# Patient Record
Sex: FEMALE | Race: WHITE | Employment: OTHER | URBAN - NONMETROPOLITAN AREA
[De-identification: names, ages, dates, MRNs, and addresses within clinical notes are randomized per-mention and may not be internally consistent; named-entity substitution may affect disease eponyms.]

---

## 2020-11-19 ENCOUNTER — OFFICE VISIT (OUTPATIENT)
Dept: PULMONOLOGY | Age: 80
End: 2020-11-19
Payer: MEDICARE

## 2020-11-19 VITALS
BODY MASS INDEX: 37.89 KG/M2 | SYSTOLIC BLOOD PRESSURE: 118 MMHG | HEART RATE: 52 BPM | HEIGHT: 65 IN | OXYGEN SATURATION: 96 % | TEMPERATURE: 96.9 F | DIASTOLIC BLOOD PRESSURE: 72 MMHG | WEIGHT: 227.4 LBS

## 2020-11-19 PROCEDURE — G8400 PT W/DXA NO RESULTS DOC: HCPCS | Performed by: NURSE PRACTITIONER

## 2020-11-19 PROCEDURE — 1036F TOBACCO NON-USER: CPT | Performed by: NURSE PRACTITIONER

## 2020-11-19 PROCEDURE — G8427 DOCREV CUR MEDS BY ELIG CLIN: HCPCS | Performed by: NURSE PRACTITIONER

## 2020-11-19 PROCEDURE — 4040F PNEUMOC VAC/ADMIN/RCVD: CPT | Performed by: NURSE PRACTITIONER

## 2020-11-19 PROCEDURE — G8417 CALC BMI ABV UP PARAM F/U: HCPCS | Performed by: NURSE PRACTITIONER

## 2020-11-19 PROCEDURE — G8484 FLU IMMUNIZE NO ADMIN: HCPCS | Performed by: NURSE PRACTITIONER

## 2020-11-19 PROCEDURE — 1123F ACP DISCUSS/DSCN MKR DOCD: CPT | Performed by: NURSE PRACTITIONER

## 2020-11-19 PROCEDURE — 1090F PRES/ABSN URINE INCON ASSESS: CPT | Performed by: NURSE PRACTITIONER

## 2020-11-19 PROCEDURE — 94618 PULMONARY STRESS TESTING: CPT | Performed by: NURSE PRACTITIONER

## 2020-11-19 PROCEDURE — 99204 OFFICE O/P NEW MOD 45 MIN: CPT | Performed by: NURSE PRACTITIONER

## 2020-11-19 RX ORDER — FUROSEMIDE 40 MG/1
40 TABLET ORAL 2 TIMES DAILY
COMMUNITY
End: 2022-06-02

## 2020-11-19 ASSESSMENT — ENCOUNTER SYMPTOMS
SHORTNESS OF BREATH: 1
NAUSEA: 0
WHEEZING: 0
CHEST TIGHTNESS: 0
VOMITING: 0
STRIDOR: 0
COUGH: 0
DIARRHEA: 0

## 2020-11-19 NOTE — PROGRESS NOTES
[facility]    Patient: Caroline Ramesh, 79 y.o.   : 2020         Subjective     Chief Complaint   Patient presents with    New Patient     New pulmonary consult ref by [facility] for SOB no testing         Shortness of Breath   This is a new problem. The current episode started 1 to 4 weeks ago. The problem occurs daily. The problem has been rapidly improving. Associated symptoms include leg swelling. Pertinent negatives include no chest pain, fever, headaches, leg pain, syncope, vomiting or wheezing. Risk factors include smoking. She has tried rest for the symptoms. The treatment provided significant relief. Her past medical history is significant for CAD and a heart failure. Jagruti Frankel is here for evaluation of shortness of breath with referral from [facility]. [facility] for SOB. Patient PMH significant for CAD, HFpEF, HTN, hypothyroidism, obesity, TIA, and unspecified sleep apnea being followed by PCP Dr. Ramón Pelaez. Patient reports about 2-3 weeks ago she started having increased SOB with exertion. patient states that currently the symptom is better but still present. She has tried rest with quick recovery of her SOB. Only happens with exertion, but then gives conflicting story that she has no SOB with ambulating at home. Patient was diagnosed with COPD in the past but no other chronic respiratory condition ie asthma, or ILD. (Told had COPD not on any inhalers, never on supplemental O2)  Patient has not been admitted or treated for pneumonia, pulmonary embolism, or respiratory failure. Patient has not been intubated for reasons other than planned procedures. Patient not the best historian. Brother rob assisting with history. Social History  Patient job history included  until 44 yrs old then became [address] traveled [employer] over the [employer]" Currently retired. She has not had exposure to aerosolized particles or hazardous fumes.  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 Denies to active tobacco smoking former smoker 22 years 1 PPD  Denies exposure to pets/animals at home. Does not know if had exposure to tuberculosis with her travel, denies any weight loss hemoptysis, night, sweats. Past Medical hx   PMH:  Past Medical History:   Diagnosis Date    CAD (coronary artery disease)     Heart Cath 2018    Chest pain     Congestive heart failure (CHF) (HCC)     Diastolic follows with Dr. Nita Peguero    Dyslipidemia     Hypertension     Hypothyroidism     Obesity     TIA (transient ischemic attack) ?     Unspecified sleep apnea     uses cpap--sometimes     SURGICAL HISTORY:  Past Surgical History:   Procedure Laterality Date    BREAST LUMPECTOMY Left     GASTRIC BYPASS SURGERY      HYSTERECTOMY      JOINT REPLACEMENT      TONSILLECTOMY AND ADENOIDECTOMY       SOCIAL HISTORY:  Social History     Tobacco Use    Smoking status: Former Smoker     Types: Cigarettes     Start date:      Last attempt to quit:      Years since quittin.9    Smokeless tobacco: Never Used   Substance Use Topics    Alcohol use: Never     Frequency: Never    Drug use: Never     ALLERGIES:No Known Allergies  FAMILY HISTORY:  Family History   Problem Relation Age of Onset    Cancer Father     Dementia Mother      CURRENT MEDICATIONS:  Current Outpatient Medications   Medication Sig Dispense Refill    furosemide (LASIX) 40 MG tablet Take 40 mg by mouth 2 times daily      Cyanocobalamin 5000 MCG SUBL Place under the tongue daily Indications: cyanocobalamin-cobamamide 5,000-100 mcg       Cholecalciferol (VITAMIN D3) 125 MCG (5000 UT) TABS Take by mouth daily      levothyroxine (SYNTHROID) 100 MCG tablet Take 100 mcg by mouth Daily      atorvastatin (LIPITOR) 10 MG tablet Take 10 mg by mouth daily      clopidogrel (PLAVIX) 75 MG tablet Take 75 mg by mouth daily      pantoprazole (PROTONIX) 40 MG tablet Take 1 tablet by mouth 2 times daily (before meals) 60 tablet 6    isosorbide mononitrate (IMDUR) 30 MG CR tablet Take 30 mg by mouth daily       aspirin 81 MG tablet Take 81 mg by mouth daily.  metoprolol (TOPROL-XL) 25 MG XL tablet Take 25 mg by mouth daily.  MULTIPLE VITAMINS PO Take 1 tablet by mouth daily.  alendronate (FOSAMAX) 70 MG tablet Take 70 mg by mouth every 7 days      mupirocin (BACTROBAN) 2 % nasal ointment by Nasal route 2 times daily Take by Nasal route 2 times daily.  ondansetron (ZOFRAN) 4 MG tablet Take 1 tablet by mouth 3 times daily as needed for Nausea or Vomiting (Patient not taking: Reported on 11/19/2020) 30 tablet 3    hydrochlorothiazide (MICROZIDE) 12.5 MG capsule Take 12.5 mg by mouth daily.  traMADol (ULTRAM) 50 MG tablet Take 50 mg by mouth every 6 hours as needed for Pain. No current facility-administered medications for this visit. Veronica BRAVO   Review of Systems   Constitutional: Negative for chills, fever and unexpected weight change. Respiratory: Positive for shortness of breath. Negative for cough, chest tightness, wheezing and stridor. Cardiovascular: Positive for leg swelling. Negative for chest pain and syncope. Gastrointestinal: Negative for diarrhea, nausea and vomiting. Genitourinary: Negative for dysuria. Neurological: Negative for headaches. Physical exam   /72 (Site: Left Upper Arm, Position: Sitting, Cuff Size: Medium Adult)   Pulse 52   Temp 96.9 °F (36.1 °C)   Ht 5' 5\" (1.651 m)   Wt 227 lb 6.4 oz (103.1 kg)   SpO2 96% Comment: on RA  BMI 37.84 kg/m²    Wt Readings from Last 3 Encounters:   11/19/20 227 lb 6.4 oz (103.1 kg)   08/13/20 220 lb (99.8 kg)     Normally 218 at home per patient    Physical Exam  Vitals signs and nursing note reviewed.    Constitutional:       General: She is not in acute distress. Appearance: She is well-developed. Comments: Elderly female BMI 37   HENT:      Head: Normocephalic and atraumatic. Neck:      Musculoskeletal: Neck supple. Trachea: No tracheal deviation. Cardiovascular:      Rate and Rhythm: Normal rate and regular rhythm. Heart sounds: Normal heart sounds. No murmur. Pulmonary:      Effort: Pulmonary effort is normal. No respiratory distress. Breath sounds: Normal breath sounds. No stridor. No wheezing or rales. Chest:      Chest wall: No tenderness. Abdominal:      General: Bowel sounds are normal.      Palpations: Abdomen is soft. Musculoskeletal:      Right lower leg: Edema present. Left lower leg: Edema present. Comments: +1   Skin:     General: Skin is warm and dry. Capillary Refill: Capillary refill takes less than 2 seconds. Neurological:      Mental Status: She is alert and oriented to person, place, and time. Comments: Some difficulty with recall   Psychiatric:         Behavior: Behavior normal.         Thought Content: Thought content normal.          results   Lung Nodule Screening     [] Qualifies    [x] Does not qualify   [] Declined    [] Completed  Age 79 smoked <30 pack years   The [facility]ends annual screening for lung cancer with low-dose computed tomography (LDCT) in adults aged 54 to [de-identified] years who have a 30 pack-year smoking history and currently smoke or have quit within the past 15 years. Screeningshould be discontinued once a person has not smoked for 15 years or develops a health problem that substantially limits life expectancy or the ability or willingness to have curative lung surgery. No supplemental O2 needed     ECHO   5/12/2017  LVEF 60-65%  No further details available      Assessment      Diagnosis Orders   1. BLOOM (dyspnea on exertion)  6 Minute Walk Test    Full PFT Study With Bronchodilator    COVID-19 Ambulatory    XR CHEST 1 VIEW   2.  Former smoker 6 Minute Walk Test    XR CHEST 1 VIEW      -SOB multifactorial differential includes the following  -? Pulmonary disease-COPD vs restriction vs combination-obesity and Hx tobacco use  -Cardiac-HFpEF vs CAD vs symptomatic bradycardia 47 BPM at 6 MWT-noted BLE edema, follows with Dr. Chiqui Patel  -Obesity/Deconditioning- BMI 37 presented in [facility] today due to unsteadiness on her feet  Plan   -6 MWT to test complaint of BLOOM, no need for supplemental O2  -Will send for Full PFT to evaluate BLOOM with history of tobacco use  -CXR to evaluate SOB  -Will schedule for sleep apnea evaluation previously diagnosed with MARTHA reportedly on CPAP stopped using due to SOB will request records from [facility] [place] regarding previous Pulmonary testing   -Advised patient to maintain follow-up with Cardiology for management of CAD and HFpEF  -Advised to maintain pneumonia vaccine with PCP and to take flu vaccine this coming season.  -Advised patient to call office with any changes, questions, or concerns regarding respiratory status    Will see Amrit Browne back in: 2-3 weeks to review testing once completed    Jose Paige CNP  11/19/2020     Addendum   Additional interpretation of the already listed information for six minute walk from scanned test result. Six Minute Walk Test  Amrit Browne 1940    Six minute walk test done in my office today by my medical assistant. April's oxygen saturation at rest on room air was 97%. Her oxygen saturation dropped to 95% on room air with exertion. Patient ambulated a total of 540 feet with oxygen. Resting Dyspnea/Yael score was 0/1  and 0/0  upon completion of the walk.      No O2 needed as previously described above    Electronically signed by LIZ Lopez CNP on 1/8/2021 at 12:32 PM

## 2020-11-23 ENCOUNTER — TELEPHONE (OUTPATIENT)
Dept: PULMONOLOGY | Age: 80
End: 2020-11-23

## 2020-12-08 NOTE — TELEPHONE ENCOUNTER
The office note has the 6 MWT scanned into it and an interpretation stating she did not qualify for O2 under it

## 2020-12-23 ENCOUNTER — TELEPHONE (OUTPATIENT)
Dept: PULMONOLOGY | Age: 80
End: 2020-12-23

## 2021-01-08 ENCOUNTER — TELEPHONE (OUTPATIENT)
Dept: PULMONOLOGY | Age: 81
End: 2021-01-08

## 2021-06-22 ENCOUNTER — TELEPHONE (OUTPATIENT)
Dept: PULMONOLOGY | Age: 81
End: 2021-06-22